# Patient Record
Sex: MALE | URBAN - METROPOLITAN AREA
[De-identification: names, ages, dates, MRNs, and addresses within clinical notes are randomized per-mention and may not be internally consistent; named-entity substitution may affect disease eponyms.]

---

## 2021-04-14 ENCOUNTER — OFFICE VISIT (OUTPATIENT)
Dept: URBAN - METROPOLITAN AREA CLINIC 91 | Facility: CLINIC | Age: 41
End: 2021-04-14

## 2021-04-14 DIAGNOSIS — S05.02XA CORNEAL ABRASION WITHOUT FB OF LEFT EYE, INITIAL ENCOUNTER: Primary | ICD-10-CM

## 2021-04-14 PROCEDURE — 99202 OFFICE O/P NEW SF 15 MIN: CPT | Performed by: OPHTHALMOLOGY

## 2021-04-14 RX ORDER — BACITRACIN 500 [USP'U]/G
OINTMENT OPHTHALMIC
Qty: 30 | Refills: 0 | Status: ACTIVE
Start: 2021-04-14

## 2021-04-14 NOTE — IMPRESSION/PLAN
Impression: Corneal abrasion without FB of left eye, initial encounter: S05.02xA. Plan: For corneal abrasion I have given patient prescription for topical antibiotic eye ointment.  

Instructed to start bacitracin ointment QID OS

## 2021-04-16 ENCOUNTER — OFFICE VISIT (OUTPATIENT)
Dept: URBAN - METROPOLITAN AREA CLINIC 91 | Facility: CLINIC | Age: 41
End: 2021-04-16

## 2021-04-16 PROCEDURE — 99212 OFFICE O/P EST SF 10 MIN: CPT | Performed by: OPHTHALMOLOGY

## 2021-04-16 NOTE — IMPRESSION/PLAN
Impression: Corneal abrasion without FB of left eye, initial encounter: [id number]. [id number]. Plan: Reduce Bacitracin juarez QHS OS. Abrasion OS is 80% healed, will continue to monitor. Patient is to d/c JUAREZ by Monday.